# Patient Record
Sex: FEMALE | Race: WHITE | NOT HISPANIC OR LATINO | Employment: FULL TIME | URBAN - METROPOLITAN AREA
[De-identification: names, ages, dates, MRNs, and addresses within clinical notes are randomized per-mention and may not be internally consistent; named-entity substitution may affect disease eponyms.]

---

## 2022-08-02 ENCOUNTER — VIRTUAL VISIT (OUTPATIENT)
Dept: FAMILY MEDICINE | Facility: CLINIC | Age: 47
End: 2022-08-02
Payer: COMMERCIAL

## 2022-08-02 DIAGNOSIS — U07.1 INFECTION DUE TO 2019 NOVEL CORONAVIRUS: Primary | ICD-10-CM

## 2022-08-02 PROCEDURE — 99203 OFFICE O/P NEW LOW 30 MIN: CPT | Mod: 95 | Performed by: FAMILY MEDICINE

## 2022-08-02 RX ORDER — DEXTROAMPHETAMINE SACCHARATE, AMPHETAMINE ASPARTATE, DEXTROAMPHETAMINE SULFATE AND AMPHETAMINE SULFATE 5; 5; 5; 5 MG/1; MG/1; MG/1; MG/1
TABLET ORAL
COMMUNITY
Start: 2016-06-02

## 2022-08-02 RX ORDER — BUPROPION HYDROCHLORIDE 300 MG/1
300 TABLET ORAL
COMMUNITY
Start: 2010-01-02

## 2022-08-02 NOTE — PROGRESS NOTES
Angela is a 46 year old who is being evaluated via a billable video visit.      How would you like to obtain your AVS? MyChart  If the video visit is dropped, the invitation should be resent by: Text to cell phone: [phone]  Will anyone else be joining your video visit? No          Assessment & Plan     Infection due to 2019 novel coronavirus  Sick for 1 day and tested positive today, has no renal suppression   Will have her to try antiviral medicine based on her current health background   - nirmatrelvir and ritonavir (PAXLOVID) therapy pack; Take 3 tablets by mouth 2 times daily for 5 days (Take 2 Nirmatrelvir tablets and 1 Ritonavir tablet twice daily for 5 days)           FUTURE APPOINTMENTS:       - Follow-up visit in 1 week with PCP if not improving     No follow-ups on file.    Gurdeep Thompson MD  [facility] HealthcareVENKATESH Miller is a 46 year old presenting for the following health issues:  No chief complaint on file.      HPI       COVID-19 Symptom Review  How many days ago did these symptoms start? Yesterday mid day start of symptoms    Are any of the following symptoms significant for you?    New or worsening difficulty breathing? Congestion in chest    Worsening cough? Yes, it's a dry cough.     Fever or chills? Yes, I felt feverish or had chills.    Headache: YES    Sore throat: No    Chest pain: No    Diarrhea: YES    Body aches? YES    What treatments has patient tried? none   Does patient live in a nursing home, group home, or shelter? No  Does patient have a way to get food/medications during quarantined? Yes, I have a friend or family member who can help me.            Review of Systems   Constitutional, HEENT, cardiovascular, pulmonary, gi and gu systems are negative, except as otherwise noted.      Objective           Vitals:  No vitals were obtained today due to virtual visit.    Physical Exam   GENERAL: Healthy, alert and no distress  EYES: Eyes grossly normal to  inspection.  No discharge or erythema, or obvious scleral/conjunctival abnormalities.  RESP: No audible wheeze, cough, or visible cyanosis.  No visible retractions or increased work of breathing.    SKIN: Visible skin clear. No significant rash, abnormal pigmentation or lesions.  NEURO: Cranial nerves grossly intact.  Mentation and speech appropriate for age.  PSYCH: Mentation appears normal, affect normal/bright, judgement and insight intact, normal speech and appearance well-groomed.                Video-Visit Details    Video Start Time: 10:48    Type of service:  Video Visit    Video End Time:11:20 AM    Originating Location (pt. Location): Home    Distant Location (provider location):  [facility]     Platform used for Video Visit: [employer]    .  ..

## 2022-08-06 ENCOUNTER — HEALTH MAINTENANCE LETTER (OUTPATIENT)
Age: 47
End: 2022-08-06

## 2022-10-22 ENCOUNTER — HEALTH MAINTENANCE LETTER (OUTPATIENT)
Age: 47
End: 2022-10-22

## 2022-12-10 ENCOUNTER — HEALTH MAINTENANCE LETTER (OUTPATIENT)
Age: 47
End: 2022-12-10

## 2023-04-27 ENCOUNTER — TELEPHONE (OUTPATIENT)
Dept: FAMILY MEDICINE | Facility: CLINIC | Age: 48
End: 2023-04-27

## 2023-04-27 NOTE — TELEPHONE ENCOUNTER
Needs of attention regarding:  -Breast Cancer Screening  -Colon Cancer Screening  -Cervical Cancer Screening  -Wellness (Physical) Visit     Health Maintenance Topics with due status: Overdue       Topic Date Due    ANNUAL REVIEW OF HM ORDERS Never done    ADVANCE CARE PLANNING Never done    MAMMO SCREENING Never done    HEPATITIS B IMMUNIZATION Never done    COLORECTAL CANCER SCREENING Never done    HIV SCREENING Never done    HEPATITIS C SCREENING Never done    PAP Never done    COVID-19 Vaccine 12/29/2021    INFLUENZA VACCINE 09/01/2022    YEARLY PREVENTIVE VISIT 11/03/2022    LIPID Never done    PHQ-2 (once per calendar year) 01/01/2023       Communication:  Patient called - message left